# Patient Record
Sex: FEMALE | Race: WHITE | NOT HISPANIC OR LATINO
[De-identification: names, ages, dates, MRNs, and addresses within clinical notes are randomized per-mention and may not be internally consistent; named-entity substitution may affect disease eponyms.]

---

## 2017-09-07 ENCOUNTER — RX ONLY (RX ONLY)
Age: 47
End: 2017-09-07

## 2018-07-11 ENCOUNTER — APPOINTMENT (OUTPATIENT)
Dept: URBAN - METROPOLITAN AREA CLINIC 186 | Age: 48
Setting detail: DERMATOLOGY
End: 2018-07-11

## 2018-07-11 DIAGNOSIS — Z41.9 ENCOUNTER FOR PROCEDURE FOR PURPOSES OTHER THAN REMEDYING HEALTH STATE, UNSPECIFIED: ICD-10-CM

## 2018-07-11 PROCEDURE — OTHER WAXING: OTHER

## 2018-07-11 PROCEDURE — OTHER DERMAPLANE: OTHER

## 2018-07-11 PROCEDURE — OTHER CHEMICAL PEEL: OTHER

## 2018-07-11 ASSESSMENT — LOCATION DETAILED DESCRIPTION DERM
LOCATION DETAILED: RIGHT INFERIOR CENTRAL MALAR CHEEK
LOCATION DETAILED: LEFT INFERIOR CENTRAL MALAR CHEEK
LOCATION DETAILED: LEFT INFERIOR MEDIAL FOREHEAD
LOCATION DETAILED: LEFT CHIN
LOCATION DETAILED: LEFT INFERIOR MEDIAL MALAR CHEEK
LOCATION DETAILED: RIGHT LATERAL EYEBROW
LOCATION DETAILED: INFERIOR MID FOREHEAD
LOCATION DETAILED: LEFT LATERAL EYEBROW

## 2018-07-11 ASSESSMENT — LOCATION SIMPLE DESCRIPTION DERM
LOCATION SIMPLE: LEFT CHEEK
LOCATION SIMPLE: LEFT FOREHEAD
LOCATION SIMPLE: LEFT EYEBROW
LOCATION SIMPLE: RIGHT EYEBROW
LOCATION SIMPLE: RIGHT CHEEK
LOCATION SIMPLE: CHIN
LOCATION SIMPLE: INFERIOR FOREHEAD

## 2018-07-11 ASSESSMENT — LOCATION ZONE DERM: LOCATION ZONE: FACE

## 2018-07-11 NOTE — PROCEDURE: DERMAPLANE
Blade: 10R blade scalpel
Price (Use Numbers Only, No Special Characters Or $): 0
Treatment Areas: face
Detail Level: Zone
Post-Care Instructions: I reviewed with the patient in detail post-care instructions.
Treatment Area Prep: acetone
Pre-Procedure Text: The patient was placed in a recumbant position on the procedure table.

## 2018-07-11 NOTE — PROCEDURE: CHEMICAL PEEL
Number Of Layers: 1
Comments: Removed after 1 minute with water - too tingly for patient.  Applied PCA revitalize mask for 5 minutes afterwards.  Removed mask with warm towerl.  Applied Environ youth essentia serum 1 and elta elements.
Price (Use Numbers Only, No Special Characters Or $): 0
Consent: Prior to the procedure, written consent was obtained and risks were reviewed, including but not limited to: redness, peeling, blistering, pigmentary change, scarring, infection, and pain.
Post Peel Care: Reviewed post treatment instructions with the patient.
Chemical Peel: Skinceuticals 2% Lactic Acid
Detail Level: Zone
Post-Care Instructions: I reviewed with the patient in detail post-care instructions. Patient should avoid sun exposure and wear sun protection.  Gave patient PCA post procedure kit
Treatment Time (Optional): 1 minute - too tingly for patient

## 2018-07-11 NOTE — PROCEDURE: WAXING
Post-Care Instructions: I reviewed with the patient in detail post-care instructions. Patient should avoid sun exposure and wear sun protection.
Techique: The unwanted hair in the treatment area(s) were removed using wax.  Tinting was applied for 3 minutes in the Middle brown.  Dermaplaning was performed after this procedure.
Detail Level: Zone
Price (Use Numbers Only, No Special Characters Or $): 0

## 2018-09-27 ENCOUNTER — APPOINTMENT (OUTPATIENT)
Dept: URBAN - METROPOLITAN AREA CLINIC 186 | Age: 48
Setting detail: DERMATOLOGY
End: 2018-09-27

## 2018-09-27 DIAGNOSIS — L73.8 OTHER SPECIFIED FOLLICULAR DISORDERS: ICD-10-CM

## 2018-09-27 DIAGNOSIS — Q828 OTHER SPECIFIED ANOMALIES OF SKIN: ICD-10-CM

## 2018-09-27 DIAGNOSIS — L65.9 NONSCARRING HAIR LOSS, UNSPECIFIED: ICD-10-CM

## 2018-09-27 DIAGNOSIS — D22 MELANOCYTIC NEVI: ICD-10-CM

## 2018-09-27 DIAGNOSIS — Q819 OTHER SPECIFIED ANOMALIES OF SKIN: ICD-10-CM

## 2018-09-27 DIAGNOSIS — L20.84 INTRINSIC (ALLERGIC) ECZEMA: ICD-10-CM

## 2018-09-27 DIAGNOSIS — D17 BENIGN LIPOMATOUS NEOPLASM: ICD-10-CM

## 2018-09-27 DIAGNOSIS — D18.0 HEMANGIOMA: ICD-10-CM

## 2018-09-27 DIAGNOSIS — L91.8 OTHER HYPERTROPHIC DISORDERS OF THE SKIN: ICD-10-CM

## 2018-09-27 DIAGNOSIS — L82.1 OTHER SEBORRHEIC KERATOSIS: ICD-10-CM

## 2018-09-27 DIAGNOSIS — Q826 OTHER SPECIFIED ANOMALIES OF SKIN: ICD-10-CM

## 2018-09-27 DIAGNOSIS — L81.4 OTHER MELANIN HYPERPIGMENTATION: ICD-10-CM

## 2018-09-27 PROBLEM — D23.71 OTHER BENIGN NEOPLASM OF SKIN OF RIGHT LOWER LIMB, INCLUDING HIP: Status: ACTIVE | Noted: 2018-09-27

## 2018-09-27 PROBLEM — D22.39 MELANOCYTIC NEVI OF OTHER PARTS OF FACE: Status: ACTIVE | Noted: 2018-09-27

## 2018-09-27 PROBLEM — D18.01 HEMANGIOMA OF SKIN AND SUBCUTANEOUS TISSUE: Status: ACTIVE | Noted: 2018-09-27

## 2018-09-27 PROBLEM — D22.61 MELANOCYTIC NEVI OF RIGHT UPPER LIMB, INCLUDING SHOULDER: Status: ACTIVE | Noted: 2018-09-27

## 2018-09-27 PROBLEM — D23.5 OTHER BENIGN NEOPLASM OF SKIN OF TRUNK: Status: ACTIVE | Noted: 2018-09-27

## 2018-09-27 PROBLEM — D23.72 OTHER BENIGN NEOPLASM OF SKIN OF LEFT LOWER LIMB, INCLUDING HIP: Status: ACTIVE | Noted: 2018-09-27

## 2018-09-27 PROBLEM — D17.23 BENIGN LIPOMATOUS NEOPLASM OF SKIN AND SUBCUTANEOUS TISSUE OF RIGHT LEG: Status: ACTIVE | Noted: 2018-09-27

## 2018-09-27 PROBLEM — D17.21 BENIGN LIPOMATOUS NEOPLASM OF SKIN AND SUBCUTANEOUS TISSUE OF RIGHT ARM: Status: ACTIVE | Noted: 2018-09-27

## 2018-09-27 PROBLEM — D22.5 MELANOCYTIC NEVI OF TRUNK: Status: ACTIVE | Noted: 2018-09-27

## 2018-09-27 PROBLEM — Q82.8 OTHER SPECIFIED CONGENITAL MALFORMATIONS OF SKIN: Status: ACTIVE | Noted: 2018-09-27

## 2018-09-27 PROCEDURE — OTHER COUNSELING: OTHER

## 2018-09-27 PROCEDURE — 99214 OFFICE O/P EST MOD 30 MIN: CPT

## 2018-09-27 PROCEDURE — OTHER REASSURANCE: OTHER

## 2018-09-27 PROCEDURE — OTHER ADDITIONAL NOTES: OTHER

## 2018-09-27 PROCEDURE — OTHER CONSULTATION EXCISION: OTHER

## 2018-09-27 PROCEDURE — OTHER TREATMENT REGIMEN: OTHER

## 2018-09-27 PROCEDURE — OTHER DEFER: OTHER

## 2018-09-27 PROCEDURE — OTHER SUNSCREEN RECOMMENDATIONS: OTHER

## 2018-09-27 PROCEDURE — OTHER PRESCRIPTION: OTHER

## 2018-09-27 RX ORDER — CLOBETASOL PROPIONATE 0.5 MG/G
CREAM TOPICAL DAILY
Qty: 1 | Refills: 3 | Status: ERX | COMMUNITY
Start: 2018-09-27

## 2018-09-27 ASSESSMENT — LOCATION SIMPLE DESCRIPTION DERM
LOCATION SIMPLE: LEFT UPPER BACK
LOCATION SIMPLE: RIGHT CHEEK
LOCATION SIMPLE: RIGHT POSTERIOR UPPER ARM
LOCATION SIMPLE: LEFT FOREHEAD
LOCATION SIMPLE: LEFT ANTERIOR NECK
LOCATION SIMPLE: ABDOMEN
LOCATION SIMPLE: CHEST
LOCATION SIMPLE: RIGHT FOREHEAD
LOCATION SIMPLE: LEFT POSTERIOR UPPER ARM
LOCATION SIMPLE: RIGHT HAND
LOCATION SIMPLE: LEFT HAND
LOCATION SIMPLE: RIGHT KNEE
LOCATION SIMPLE: RIGHT UPPER ARM
LOCATION SIMPLE: RIGHT FOREARM
LOCATION SIMPLE: LEFT BREAST
LOCATION SIMPLE: LEFT CHEEK
LOCATION SIMPLE: RIGHT UPPER BACK

## 2018-09-27 ASSESSMENT — LOCATION DETAILED DESCRIPTION DERM
LOCATION DETAILED: RIGHT CENTRAL MALAR CHEEK
LOCATION DETAILED: LEFT INFERIOR CENTRAL MALAR CHEEK
LOCATION DETAILED: RIGHT INFERIOR FOREHEAD
LOCATION DETAILED: EPIGASTRIC SKIN
LOCATION DETAILED: LEFT SUPERIOR UPPER BACK
LOCATION DETAILED: RIGHT ANTERIOR DISTAL UPPER ARM
LOCATION DETAILED: PERIUMBILICAL SKIN
LOCATION DETAILED: RIGHT KNEE
LOCATION DETAILED: UPPER STERNUM
LOCATION DETAILED: LEFT PROXIMAL POSTERIOR UPPER ARM
LOCATION DETAILED: RIGHT INFERIOR CENTRAL MALAR CHEEK
LOCATION DETAILED: LEFT ULNAR DORSAL HAND
LOCATION DETAILED: RIGHT SUPERIOR UPPER BACK
LOCATION DETAILED: LEFT MID-UPPER BACK
LOCATION DETAILED: LEFT FOREHEAD
LOCATION DETAILED: RIGHT PROXIMAL POSTERIOR UPPER ARM
LOCATION DETAILED: LEFT SUPERIOR LATERAL NECK
LOCATION DETAILED: RIGHT RADIAL DORSAL HAND
LOCATION DETAILED: LEFT INFRAMAMMARY CREASE (INNER QUADRANT)
LOCATION DETAILED: RIGHT PROXIMAL RADIAL DORSAL FOREARM

## 2018-09-27 ASSESSMENT — LOCATION ZONE DERM
LOCATION ZONE: LEG
LOCATION ZONE: FACE
LOCATION ZONE: TRUNK
LOCATION ZONE: NECK
LOCATION ZONE: ARM
LOCATION ZONE: HAND

## 2018-09-27 NOTE — HPI: SKIN LESION
How Severe Is Your Skin Lesion?: mild
Has Your Skin Lesion Been Treated?: not been treated
Is This A New Presentation, Or A Follow-Up?: Growths
Additional History: Patient states she believes they are lipomas

## 2018-09-27 NOTE — PROCEDURE: TREATMENT REGIMEN
Start Regimen: Clobetasol cream, apply to affected areas twice daily x3 weeks. Take 1 week off, repeat as needed Initiate Regimen: Clobetasol cream, apply to affected areas twice daily x3 weeks. Take 1 week off, repeat as needed

## 2018-09-27 NOTE — PROCEDURE: ADDITIONAL NOTES
Additional Notes: Discussed with patient the extensive workup involved, recommend patient schedule a separate appointment. Hair loss questionnaire sent home with patient
Detail Level: Simple

## 2018-09-27 NOTE — PROCEDURE: REASSURANCE
Include Location In Plan?: Yes
Detail Level: Zone
Hide Additional Notes?: No
Detail Level: Simple
Detail Level: Detailed
Detail Level: Generalized
Additional Note: Patient concerned about scattered plaques to body. Benign, reassurance provided

## 2018-10-01 ENCOUNTER — APPOINTMENT (OUTPATIENT)
Dept: URBAN - METROPOLITAN AREA CLINIC 186 | Age: 48
Setting detail: DERMATOLOGY
End: 2018-10-01

## 2018-10-01 DIAGNOSIS — L65.9 NONSCARRING HAIR LOSS, UNSPECIFIED: ICD-10-CM

## 2018-10-01 PROCEDURE — OTHER ADDITIONAL NOTES: OTHER

## 2018-10-01 PROCEDURE — 99214 OFFICE O/P EST MOD 30 MIN: CPT

## 2018-10-01 PROCEDURE — OTHER COUNSELING: OTHER

## 2018-10-01 PROCEDURE — OTHER DIAGNOSIS COMMENT: OTHER

## 2018-10-01 ASSESSMENT — LOCATION ZONE DERM: LOCATION ZONE: SCALP

## 2018-10-01 ASSESSMENT — LOCATION DETAILED DESCRIPTION DERM: LOCATION DETAILED: POSTERIOR MID-PARIETAL SCALP

## 2018-10-01 ASSESSMENT — LOCATION SIMPLE DESCRIPTION DERM: LOCATION SIMPLE: POSTERIOR SCALP

## 2018-10-01 NOTE — PROCEDURE: DIAGNOSIS COMMENT
Detail Level: Zone
Comment: Discussed hair loss at length. Discussed ddx with the patient including underlying reasons for hair loss including lupus metoprolol, and low likelihood of telogen effluvium. Discussed no tug or pull hair shedding today. Telogen effluvium low likelihood because hair loss was before icu event. Patient states she cannot have her metoprolol stopped. Discussed lupus would be High on ddx. Will await previous blood work and talk about options.

## 2018-10-01 NOTE — HPI: HAIR LOSS
How Did The Hair Loss Occur?: gradual in onset
How Severe Is Your Hair Loss?: mild
What Hair Products Do You Use?: Biotin, ogx,
Additional History: History of hysterectomy

## 2018-10-01 NOTE — PROCEDURE: ADDITIONAL NOTES
Additional Notes: Patient had recent blood work from Rheumatologist, will have results sent to us. Once we receive blood work we may need additional blood work drawn. \\nDiscussed telogen effluvium vs female pattern hair loss vs loss due to lupus vs loss due to beta blocker. Reviewed effects of lupus and beta blockers have in regards to hair loss. Patient understands that if associated with lupus the treatment options are minimal. \\nDiscussed starting Rogaine 5% foam once daily. Reviewed risks, side effects of treatment. Discussed starting spironolactone with patient, patient would need to consult with cardiologist before starting. Will also need to consider her kidney function. Discussed PRP, reviewed that currently treatment is not available at our clinic, aware that treatment would not be covered by insurance. Patient will wait until blood work can be reviewed before starting any treatments.
Detail Level: Simple